# Patient Record
(demographics unavailable — no encounter records)

---

## 2025-06-19 NOTE — PLAN
[FreeTextEntry1] : TVUS for painful menses and strong fhx of fibroids- both sisters have dx seeing endocrine for possible thyroid disorder

## 2025-06-19 NOTE — HISTORY OF PRESENT ILLNESS
[TextBox_4] : new pt presents for annual WWE  denies any prior abnormal paps  sexually active but c/o low libido  gets monthly menses  desires sti screening

## 2025-06-19 NOTE — PHYSICAL EXAM
[MA] : MA [FreeTextEntry2] : sim  [Soft] : soft [Non-tender] : non-tender [Non-distended] : non-distended [Examination Of The Breasts] : a normal appearance [No Masses] : no breast masses were palpable [Labia Majora] : normal [Labia Minora] : normal [Normal] : normal